# Patient Record
Sex: FEMALE | Race: WHITE
[De-identification: names, ages, dates, MRNs, and addresses within clinical notes are randomized per-mention and may not be internally consistent; named-entity substitution may affect disease eponyms.]

---

## 2018-02-09 ENCOUNTER — HOSPITAL ENCOUNTER (OUTPATIENT)
Dept: HOSPITAL 17 - NEPC | Age: 48
Setting detail: OBSERVATION
Discharge: HOME | End: 2018-02-09
Attending: INTERNAL MEDICINE | Admitting: INTERNAL MEDICINE
Payer: COMMERCIAL

## 2018-02-09 VITALS
HEART RATE: 96 BPM | RESPIRATION RATE: 18 BRPM | DIASTOLIC BLOOD PRESSURE: 87 MMHG | SYSTOLIC BLOOD PRESSURE: 129 MMHG | OXYGEN SATURATION: 95 %

## 2018-02-09 VITALS
RESPIRATION RATE: 18 BRPM | DIASTOLIC BLOOD PRESSURE: 67 MMHG | SYSTOLIC BLOOD PRESSURE: 123 MMHG | HEART RATE: 83 BPM | OXYGEN SATURATION: 96 %

## 2018-02-09 VITALS — HEART RATE: 74 BPM

## 2018-02-09 VITALS — OXYGEN SATURATION: 98 % | RESPIRATION RATE: 18 BRPM

## 2018-02-09 VITALS
DIASTOLIC BLOOD PRESSURE: 74 MMHG | OXYGEN SATURATION: 95 % | SYSTOLIC BLOOD PRESSURE: 120 MMHG | HEART RATE: 79 BPM | TEMPERATURE: 98.3 F | RESPIRATION RATE: 20 BRPM

## 2018-02-09 VITALS — BODY MASS INDEX: 38.97 KG/M2 | WEIGHT: 242.51 LBS | HEIGHT: 66 IN

## 2018-02-09 DIAGNOSIS — E11.40: ICD-10-CM

## 2018-02-09 DIAGNOSIS — I10: ICD-10-CM

## 2018-02-09 DIAGNOSIS — K21.9: ICD-10-CM

## 2018-02-09 DIAGNOSIS — E78.00: ICD-10-CM

## 2018-02-09 DIAGNOSIS — H91.90: ICD-10-CM

## 2018-02-09 DIAGNOSIS — J45.909: ICD-10-CM

## 2018-02-09 DIAGNOSIS — F41.9: ICD-10-CM

## 2018-02-09 DIAGNOSIS — R07.89: Primary | ICD-10-CM

## 2018-02-09 DIAGNOSIS — Z79.899: ICD-10-CM

## 2018-02-09 DIAGNOSIS — Z79.84: ICD-10-CM

## 2018-02-09 DIAGNOSIS — Z87.11: ICD-10-CM

## 2018-02-09 LAB
ALBUMIN SERPL-MCNC: 4 GM/DL (ref 3.4–5)
ALP SERPL-CCNC: 64 U/L (ref 45–117)
ALT SERPL-CCNC: 37 U/L (ref 10–53)
AST SERPL-CCNC: 36 U/L (ref 15–37)
BASOPHILS # BLD AUTO: 0.1 TH/MM3 (ref 0–0.2)
BASOPHILS NFR BLD: 0.8 % (ref 0–2)
BILIRUB SERPL-MCNC: 0.3 MG/DL (ref 0.2–1)
BUN SERPL-MCNC: 10 MG/DL (ref 7–18)
CALCIUM SERPL-MCNC: 9.4 MG/DL (ref 8.5–10.1)
CHLORIDE SERPL-SCNC: 101 MEQ/L (ref 98–107)
CREAT SERPL-MCNC: 1 MG/DL (ref 0.5–1)
D-DIMER: 0.29 MG/L FEU (ref 0–0.5)
EOSINOPHIL # BLD: 0.1 TH/MM3 (ref 0–0.4)
EOSINOPHIL NFR BLD: 0.8 % (ref 0–4)
ERYTHROCYTE [DISTWIDTH] IN BLOOD BY AUTOMATED COUNT: 15.4 % (ref 11.6–17.2)
GFR SERPLBLD BASED ON 1.73 SQ M-ARVRAT: 59 ML/MIN (ref 89–?)
GLUCOSE SERPL-MCNC: 107 MG/DL (ref 74–106)
HCO3 BLD-SCNC: 25.9 MEQ/L (ref 21–32)
HCT VFR BLD CALC: 39.9 % (ref 35–46)
HGB BLD-MCNC: 13.5 GM/DL (ref 11.6–15.3)
INR PPP: 1 RATIO
LYMPHOCYTES # BLD AUTO: 6.6 TH/MM3 (ref 1–4.8)
LYMPHOCYTES NFR BLD AUTO: 47.5 % (ref 9–44)
LYMPHOCYTES: 42 % (ref 9–44)
MCH RBC QN AUTO: 26.1 PG (ref 27–34)
MCHC RBC AUTO-ENTMCNC: 33.9 % (ref 32–36)
MCV RBC AUTO: 77 FL (ref 80–100)
MONOCYTE #: 0.9 TH/MM3 (ref 0–0.9)
MONOCYTES NFR BLD: 6.8 % (ref 0–8)
MONOCYTES: 3 % (ref 0–8)
NEUTROPHILS # BLD AUTO: 6.1 TH/MM3 (ref 1.8–7.7)
NEUTROPHILS NFR BLD AUTO: 44.1 % (ref 16–70)
NEUTS BAND # BLD MANUAL: 7.5 TH/MM3 (ref 1.8–7.7)
NEUTS BAND NFR BLD: 2 % (ref 0–6)
NEUTS SEG NFR BLD MANUAL: 52 % (ref 16–70)
PLATELET # BLD: 359 TH/MM3 (ref 150–450)
PMV BLD AUTO: 7.5 FL (ref 7–11)
PROT SERPL-MCNC: 9 GM/DL (ref 6.4–8.2)
PROTHROMBIN TIME: 10.4 SEC (ref 9.8–11.6)
RBC # BLD AUTO: 5.18 MIL/MM3 (ref 4–5.3)
SODIUM SERPL-SCNC: 136 MEQ/L (ref 136–145)
TROPONIN I SERPL-MCNC: (no result) NG/ML (ref 0.02–0.05)
WBC # BLD AUTO: 13.8 TH/MM3 (ref 4–11)

## 2018-02-09 PROCEDURE — 83880 ASSAY OF NATRIURETIC PEPTIDE: CPT

## 2018-02-09 PROCEDURE — 80053 COMPREHEN METABOLIC PANEL: CPT

## 2018-02-09 PROCEDURE — 85379 FIBRIN DEGRADATION QUANT: CPT

## 2018-02-09 PROCEDURE — G0378 HOSPITAL OBSERVATION PER HR: HCPCS

## 2018-02-09 PROCEDURE — 83690 ASSAY OF LIPASE: CPT

## 2018-02-09 PROCEDURE — 71045 X-RAY EXAM CHEST 1 VIEW: CPT

## 2018-02-09 PROCEDURE — 82550 ASSAY OF CK (CPK): CPT

## 2018-02-09 PROCEDURE — 85027 COMPLETE CBC AUTOMATED: CPT

## 2018-02-09 PROCEDURE — 99285 EMERGENCY DEPT VISIT HI MDM: CPT

## 2018-02-09 PROCEDURE — 93005 ELECTROCARDIOGRAM TRACING: CPT

## 2018-02-09 PROCEDURE — 82552 ASSAY OF CPK IN BLOOD: CPT

## 2018-02-09 PROCEDURE — 96374 THER/PROPH/DIAG INJ IV PUSH: CPT

## 2018-02-09 PROCEDURE — 84484 ASSAY OF TROPONIN QUANT: CPT

## 2018-02-09 PROCEDURE — 85007 BL SMEAR W/DIFF WBC COUNT: CPT

## 2018-02-09 PROCEDURE — 85610 PROTHROMBIN TIME: CPT

## 2018-02-09 PROCEDURE — 93017 CV STRESS TEST TRACING ONLY: CPT

## 2018-02-09 PROCEDURE — 85730 THROMBOPLASTIN TIME PARTIAL: CPT

## 2018-02-09 NOTE — EKG
Date Performed: 02/09/2018       Time Performed: 07:29:35

 

PTAGE:      47 years

 

EKG:      Sinus rhythm 

 

 NORMAL ECG 

 

NO PREVIOUS TRACING            

 

DOCTOR:   Benji Lopez  Interpretating Date/Time  02/09/2018 16:12:54

## 2018-02-09 NOTE — PD
HPI


Chief Complaint:  Chest Pain


Time Seen by Provider:  07:30


Travel History


International Travel<30 days:  No


Contact w/Intl Traveler<30days:  No


Traveled to known affect area:  No





History of Present Illness


HPI


Patient stated that about 2 hours ago she experienced some left-sided chest 

pressure radiating to her left shoulder, about 8 out of 10.  No alleviating or 

aggravating factors.  No associated factors such as fever, cough, back pain, 

abdominal pain, nausea, vomiting, diarrhea.








Allergy: Denies


PCP


Past medical history significant for diabetes hypertension migraine bipolar 

partial hysterectomy and peptic ulcer disease


Denies smoking





PFSH


Past Medical History


Asthma:  Yes


Depression:  Yes


Cancer:  No


Cardiovascular Problems:  No


High Cholesterol:  Yes


Diabetes:  Yes


Patient Takes Glucophage:  Yes


Diminished Hearing:  Yes (BILATERAL RINGING)


Glaucoma:  No


Hepatitis:  No


Hiatal Hernia:  No


Hypertension:  Yes


Medical other:  Yes (DEPRESSION)


Respiratory:  Yes (ASTHMA)


Immunizations Current:  Yes


Migraines:  Yes


Thyroid Disease:  No


Ulcer:  Yes


Pregnant?:  Not Pregnant


:  0





Past Surgical History


Oral Surgery:  Yes (WISDOM TEETH REMOVED)


Pacemaker:  No


Other Surgery:  Yes





Social History


Alcohol Use:  No


Tobacco Use:  No


Substance Use:  No





Allergies-Medications


(Allergen,Severity, Reaction):  


Coded Allergies:  


     No Known Allergies (Verified  Allergy, Unknown, 18)


Reported Meds & Prescriptions





Reported Meds & Active Scripts


Active


Reported


Ventolin Hfa 18 GM Inh (Albuterol Sulfate) 90 Mcg/Act Aer 2 Puff INH Q6H PRN


Glimepiride 4 Mg Tab 4 Mg PO DAILY


     Take with breakfast or first main meal


Jardiance (Empagliflozin) 10 Mg Tab 10 Mg PO DAILY


Omeprazole 20 Mg Tab 20 Mg PO DAILY


Bupropion HCl 100 Mg Tab 100 Mg PO BID


Citalopram (Citalopram Hydrobromide) 20 Mg Tab 20 Mg PO DAILY


Lisinopril 10 Mg Tab 10 Mg PO DAILY


Simvastatin 40 Mg Tab 40 Mg PO HS


Metformin (Metformin HCl) 1,000 Mg Tab 1,000 Mg PO BIDPC








Review of Systems


General / Constitutional:  No: Fever


Eyes:  No: Visual changes


HENT:  No: Headaches


Cardiovascular:  Positive: Chest Pain or Discomfort


Respiratory:  No: Shortness of Breath


Gastrointestinal:  No: Abdominal Pain


Genitourinary:  No: Dysuria


Musculoskeletal:  No: Pain


Skin:  No Rash


Neurologic:  No: Weakness


Psychiatric:  No: Depression


Endocrine:  No: Polydipsia


Hematologic/Lymphatic:  No: Easy Bruising





Physical Exam


Narrative


GENERAL: 


SKIN: Warm and dry.


HEAD: Atraumatic. Normocephalic. 


EYES: Pupils equal and round. No scleral icterus. No injection or drainage. 


ENT: No nasal bleeding or discharge.  Mucous membranes pink and moist.


NECK: Trachea midline. No JVD. 


CARDIOVASCULAR: Regular rate and rhythm.  


RESPIRATORY: No accessory muscle use. Clear to auscultation. Breath sounds 

equal bilaterally. 


GASTROINTESTINAL: Abdomen soft, non-tender, nondistended. 


MUSCULOSKELETAL: Extremities without clubbing, cyanosis, or edema. No obvious 

deformities. 


NEUROLOGICAL: Awake and alert. No obvious cranial nerve deficits.  Motor 

grossly within normal limits. Five out of 5 muscle strength in the arms and 

legs.  Normal speech.


PSYCHIATRIC: Appropriate mood and affect; insight and judgment normal.





Data


Data


Last Documented VS





Vital Signs








  Date Time  Temp Pulse Resp B/P (MAP) Pulse Ox O2 Delivery O2 Flow Rate FiO2


 


18 07:32     98 Room Air  


 


18 07:32   18     


 


18 07:29  96      








Orders





 Orders


Electrocardiogram (18 07:30)


B-Type Natriuretic Peptide (18 07:30)


Ckmb (Isoenzyme) Profile (18 07:30)


Complete Blood Count With Diff (18 07:30)


Comprehensive Metabolic Panel (18 07:30)


D-Dimer (18 07:30)


Prothrombin Time / Inr (Pt) (18 07:30)


Act Partial Throm Time (Ptt) (18 07:30)


Troponin I (18 07:30)


Lipase (18 07:30)


Chest, Single Ap (18 07:30)


Ecg Monitoring (18 07:30)


Iv Access Insert/Monitor (18 07:30)


Oximetry (18 07:30)


Oxygen Administration (18 07:30)


Sodium Chloride 0.9% Flush (Ns Flush) (18 07:30)


Aspirin Chew (Aspirin Chew) (18 07:45)


Morphine Inj (Morphine Inj) (18 07:45)


Nitroglycerin 2% Oint (Nitroglycerin 2% (18 07:45)


CKMB (18 07:34)


CKMB% (18 07:34)


Admit Order (Ed Use Only) (18 09:10)





Labs





Laboratory Tests








Test


  18


07:34


 


White Blood Count 13.8 TH/MM3 


 


Red Blood Count 5.18 MIL/MM3 


 


Hemoglobin 13.5 GM/DL 


 


Hematocrit 39.9 % 


 


Mean Corpuscular Volume 77.0 FL 


 


Mean Corpuscular Hemoglobin 26.1 PG 


 


Mean Corpuscular Hemoglobin


Concent 33.9 % 


 


 


Red Cell Distribution Width 15.4 % 


 


Platelet Count 359 TH/MM3 


 


Mean Platelet Volume 7.5 FL 


 


Neutrophils (%) (Auto) 44.1 % 


 


Lymphocytes (%) (Auto) 47.5 % 


 


Monocytes (%) (Auto) 6.8 % 


 


Eosinophils (%) (Auto) 0.8 % 


 


Basophils (%) (Auto) 0.8 % 


 


Neutrophils # (Auto) 6.1 TH/MM3 


 


Lymphocytes # (Auto) 6.6 TH/MM3 


 


Monocytes # (Auto) 0.9 TH/MM3 


 


Eosinophils # (Auto) 0.1 TH/MM3 


 


Basophils # (Auto) 0.1 TH/MM3 


 


CBC Comment AUTO DIFF 


 


Differential Total Cells


Counted 100 


 


 


Neutrophils % (Manual) 52 % 


 


Band Neutrophils % 2 % 


 


Lymphocytes % 42 % 


 


Monocytes % 3 % 


 


Eosinophils % 1 % 


 


Neutrophils # (Manual) 7.5 TH/MM3 


 


Differential Comment


  FINAL DIFF


MANUAL


 


Atypical Lymphocytes  % 


 


Platelet Estimate NORMAL 


 


Platelet Morphology Comment NORMAL 


 


Prothrombin Time 10.4 SEC 


 


Prothromb Time International


Ratio 1.0 RATIO 


 


 


Activated Partial


Thromboplast Time 24.5 SEC 


 


 


D-Dimer Quantitative (PE/DVT) 0.29 MG/L FEU 


 


Blood Urea Nitrogen 10 MG/DL 


 


Creatinine 1.00 MG/DL 


 


Random Glucose 107 MG/DL 


 


Total Protein 9.0 GM/DL 


 


Albumin 4.0 GM/DL 


 


Calcium Level 9.4 MG/DL 


 


Alkaline Phosphatase 64 U/L 


 


Aspartate Amino Transf


(AST/SGOT) 36 U/L 


 


 


Alanine Aminotransferase


(ALT/SGPT) 37 U/L 


 


 


Total Bilirubin 0.3 MG/DL 


 


Sodium Level 136 MEQ/L 


 


Potassium Level 4.0 MEQ/L 


 


Chloride Level 101 MEQ/L 


 


Carbon Dioxide Level 25.9 MEQ/L 


 


Anion Gap 9 MEQ/L 


 


Estimat Glomerular Filtration


Rate 59 ML/MIN 


 


 


Total Creatine Kinase 182 U/L 


 


Creatine Kinase MB 7.0 NG/ML 


 


Troponin I


  LESS THAN 0.02


NG/ML


 


B-Type Natriuretic Peptide 4 PG/ML 


 


Lipase 165 U/L 











MDM


Medical Decision Making


Medical Screen Exam Complete:  Yes


Emergency Medical Condition:  Yes


Medical Record Reviewed:  Yes


Interpretation(s)


EKG shows normal sinus rhythm, 83 bpm, normal intervals, no STEMI pattern noted.





Pulse ox: Normal PLETH wave.  Pulse ox 98 on room air which is within normal 

limits


Differential Diagnosis


Pneumonia versus pneumothorax versus STEMI versus non-STEMI


Narrative Course


CXR NEG FOR PNA/PTX/PLEURAL EFFUSION....TROPONIN FIRST NEG, ELECTROLYTE WNL, NL 

LFT'S/LIPASE.





Diagnosis





 Primary Impression:  


 Chest pain, rule out acute myocardial infarction





Admitting Information


Admitting Physician Requests:  Marcelino Nassar MD 2018 07:44

## 2018-02-09 NOTE — EKG
Date Performed: 02/09/2018       Time Performed: 14:27:29

 

PTAGE:      47 years

 

EKG:      Sinus rhythm 

 

 NORMAL ECG

 

PREVIOUS TRACING       : 02/09/2018 11.00 Since previous tracing, no significant change noted

 

DOCTOR:   Benji Lopez  Interpretating Date/Time  02/09/2018 16:12:10

## 2018-02-09 NOTE — HHI.HP
HPI


Primary Care Physician


Mars Blankenship MD


Chief Complaint


Chest pain


History of Present Illness


47-year-old female with history of diabetes, hypertension, and hyperlipidemia 

presents to emergency room for further evaluation chest pain.  Onset 1:30 AM.  

Works night shift 7p-7a.  Location left inframammary area with radiation to 

left scapula area.  Characterized as a gradual pressure that lingered.  No 

particular movement or position may pain better or worse.  It did not hurt to 

take a deep breath.  No associated symptoms of nausea, vomiting, dyspnea, or 

diaphoresis. Duration all evening. No known precipitating factors.  Relieving 

factors morphine provider in ER.  Currently describes chest pain as a "nagging 

ache." Unsure if pain ever has gone completely aware. Denies similar pain in 

the past.





Review of Systems


General: No fatigue,weakness, fever, chills, or recent illness change in 

appetite.  Has been her general state health.


HEENT: No HA, no vision changes, no nasal congestion or drainage, no dysphasia


CV: As stated above.  No palpitations, intermittent leg pain, or dizziness


RESP: No SOB, cough, wheeze, or recent URI.  History of asthma.  Endorsing 

asthma severe since December, although improved last 4 days.


GI: No nausea, vomiting, bowel changes, diarrhea, constipation, pain, distention

, melena, blood in the stool.  


: No dysuria, urgency, frequency, hematuria


EXT: No lower leg edema, no paraesthesias, neuropathy bilateral hands


MS: No discomfort or change in ROM, no recent fall, injury, or trauma.


NEURO: No change in memory, dizziness, difficulty with balance, LOC, motor/

sensory deficits


PSYCH: History of anxiety or managed on current medication regimen.  No 

depression or suicidal ideation.  Current situational stress.


SKIN: No rashes, no concerning lesions





Past Family Social History


Allergies:  


Coded Allergies:  


     No Known Allergies (Verified  Allergy, Unknown, 18)


Past Medical History


Type 2 diabetes, hyperlipidemia, hypertension, GERD, asthma, anxiety, neuropathy


Past Surgical History


[place] teeth extracted


Reported Medications





Reported Meds & Active Scripts


Active


Reported


Ventolin Hfa 18 GM Inh (Albuterol Sulfate) 90 Mcg/Act Aer 2 Puff INH Q6H PRN


Glimepiride 4 Mg Tab 4 Mg PO DAILY


     Take with breakfast or first main meal


Jardiance (Empagliflozin) 10 Mg Tab 10 Mg PO DAILY


Omeprazole 20 Mg Tab 20 Mg PO DAILY


Bupropion HCl 100 Mg Tab 100 Mg PO BID


Citalopram (Citalopram Hydrobromide) 20 Mg Tab 20 Mg PO DAILY


Lisinopril 10 Mg Tab 10 Mg PO DAILY


Simvastatin 40 Mg Tab 40 Mg PO HS


Metformin (Metformin HCl) 1,000 Mg Tab 1,000 Mg PO BIDPC


Active Ordered Medications





Current Medications








 Medications


  (Trade)  Dose


 Ordered  Sig/Sol


 Route  Start Time


 Stop Time Status Last Admin


 


  (NS Flush)  2 ml  UNSCH  PRN


 IVF  18 07:30


     


 


 


  (NS Flush)  2 ml  BID


 IV FLUSH  18 21:00


     


 


 


  (Tylenol)  500 mg  Q4H  PRN


 PO  18 10:00


     


 


 


  (Zofran Inj)  4 mg  Q6H  PRN


 IV PUSH  18 10:00


     


 


 


  (Nitrostat Sl)  0.4 mg  Q5M  PRN


 SL  18 10:00


     


 


 


  (Aspirin)  325 mg  DAILY


 PO  2/10/18 09:00


     


 








Family History


Noncontributory for early onset cardiovascular disease.


Social History


No known coronary artery disease.  Known hypertension, hyperlipidemia, and 

diabetes.


Lifelong nonsmoker.  Denies any alcohol or illegal drug use.


Works as a .  Endorses sedentary lifestyle.





Past cardiac testing


None





Physical Exam


Vital Signs





Vital Signs








  Date Time  Temp Pulse Resp B/P (MAP) Pulse Ox O2 Delivery O2 Flow Rate FiO2


 


18 10:45 98.3 79 20 120/74 (89) 95   


 


18 09:51  83 18 123/67 (85) 96 Room Air  


 


18 07:32     98 Room Air  


 


18 07:32   18  98 Room Air  


 


18 07:29  96 18 129/87 (101) 95 Room Air  


 


18 07:29  86 18  95 Room Air  








Physical Exam


GENERAL: Alert WN, WD, NAD, pleasant, friendly, morbidly obese  female


HEAD: NC, AT


EYES: Sclera clear, conjunctiva without injection, pupils equal and round


ENT: Mucous membranes pink and moist


CV: RRR, without murmur, rub, gallop, no JVD, S1-S2 no S3-S4.  Chest wall 

nontender with palpation.


RESP: Clear lungs throughout bilateral, no crackles, wheeze, rhonchi, 

symmetrical chest rise, nonlabored, able to speak in full sentences


ABD: Soft, NT, ND, positive bowel tones


BACK: No scoliosis


EXT: Pulses +24, no dependent edema


MS: Normal tone 4 extremities, left posterior mid scapula area tender with 

palpation, no obvious deformities, full range of motion


NEURO: CN II through CN XII grossly intact, motor strength 5/5


PSYCH: A+O 3, pleasant affect, appropriate speech, mood, insight and judgment


SKIN: Normal turgor, normal texture, no lesions, no rashes, brisk cap refill, 

even hair distribution


Laboratory





Laboratory Tests








Test


  18


07:34 18


10:40


 


White Blood Count 13.8  


 


Red Blood Count 5.18  


 


Hemoglobin 13.5  


 


Hematocrit 39.9  


 


Mean Corpuscular Volume 77.0  


 


Mean Corpuscular Hemoglobin 26.1  


 


Mean Corpuscular Hemoglobin


Concent 33.9 


  


 


 


Red Cell Distribution Width 15.4  


 


Platelet Count 359  


 


Mean Platelet Volume 7.5  


 


Neutrophils (%) (Auto) 44.1  


 


Lymphocytes (%) (Auto) 47.5  


 


Monocytes (%) (Auto) 6.8  


 


Eosinophils (%) (Auto) 0.8  


 


Basophils (%) (Auto) 0.8  


 


Neutrophils # (Auto) 6.1  


 


Lymphocytes # (Auto) 6.6  


 


Monocytes # (Auto) 0.9  


 


Eosinophils # (Auto) 0.1  


 


Basophils # (Auto) 0.1  


 


CBC Comment AUTO DIFF  


 


Differential Total Cells


Counted 100 


  


 


 


Neutrophils % (Manual) 52  


 


Band Neutrophils % 2  


 


Lymphocytes % 42  


 


Monocytes % 3  


 


Eosinophils % 1  


 


Neutrophils # (Manual) 7.5  


 


Differential Comment


  FINAL DIFF


MANUAL 


 


 


Atypical Lymphocytes   


 


Platelet Estimate NORMAL  


 


Platelet Morphology Comment NORMAL  


 


Prothrombin Time 10.4  


 


Prothromb Time International


Ratio 1.0 


  


 


 


Activated Partial


Thromboplast Time 24.5 


  


 


 


D-Dimer Quantitative (PE/DVT) 0.29  


 


Blood Urea Nitrogen 10  


 


Creatinine 1.00  


 


Random Glucose 107  


 


Total Protein 9.0  


 


Albumin 4.0  


 


Calcium Level 9.4  


 


Alkaline Phosphatase 64  


 


Aspartate Amino Transf


(AST/SGOT) 36 


  


 


 


Alanine Aminotransferase


(ALT/SGPT) 37 


  


 


 


Total Bilirubin 0.3  


 


Sodium Level 136  


 


Potassium Level 4.0  


 


Chloride Level 101  


 


Carbon Dioxide Level 25.9  


 


Anion Gap 9  


 


Estimat Glomerular Filtration


Rate 59 


  


 


 


Total Creatine Kinase 182  174 


 


Creatine Kinase MB 7.0  5.9 


 


Troponin I LESS THAN 0.02  LESS THAN 0.02 


 


B-Type Natriuretic Peptide 4  


 


Lipase 165  








Result Diagram:  


2/9/18 0734                                                                    

            2/9/18 0734





Imaging





Last 48 hours Impressions








Chest X-Ray 18 Signed





Impressions: 





 Service Date/Time:  2018 07:45 - CONCLUSION: No acute 





 disease.       Mitch Syed MD 








Course


EKG


Sinus rhythm, normal axis, no ST or T-segment changes





Caprini VTE Risk Assessment


Caprini VTE Risk Assessment:  No/Low Risk (score <= 1)


Caprini Risk Assessment Model











 Point Value = 1          Point Value = 2  Point Value = 3  Point Value = 5


 


Age 41-60


Minor surgery


BMI > 25 kg/m2


Swollen legs


Varicose veins


Pregnancy or postpartum


History of unexplained or recurrent


   spontaneous 


Oral contraceptives or hormone


   replacement


Sepsis (< 1 month)


Serious lung disease, including


   pneumonia (< 1 month)


Abnormal pulmonary function


Acute myocardial infarction


Congestive heart failure (< 1 month)


History of inflammatory bowel disease


Medical patient at bed rest Age 61-74


Arthroscopic surgery


Major open surgery (> 45 min)


Laparoscopic surgery (> 45 min)


Malignancy


Confined to bed (> 72 hours)


Immobilizing plaster cast


Central venous access Age >= 75


History of VTE


Family history of VTE


Factor V Leiden


Prothrombin 36269S


Lupus anticoagulant


Anticardiolipin antibodies


Elevated serum homocysteine


Heparin-induced thrombocytopenia


Other congenital or acquired


   thrombophilia Stroke (< 1 month)


Elective arthroplasty


Hip, pelvis, or leg fracture


Acute spinal cord injury (< 1 month)








Prophylaxis Regimen











   Total Risk


Factor Score Risk Level Prophylaxis Regimen


 


0-1      Low Early ambulation


 


2 Moderate Order ONE of the following:


*Sequential Compression Device (SCD)


*Heparin 5000 units SQ BID


 


3-4 Higher Order ONE of the following medications:


*Heparin 5000 units SQ TID


*Enoxaparin/Lovenox 40 mg SQ daily (WT < 150 kg, CrCl > 30 mL/min)


*Enoxaparin/Lovenox 30 mg SQ daily (WT < 150 kg, CrCl > 10-29 mL/min)


*Enoxaparin/Lovenox 30 mg SQ BID (WT < 150 kg, CrCl > 30 mL/min)


AND/OR


*Sequential Compression Device (SCD)


 


5 or more Highest Order ONE of the following medications:


*Heparin 5000 units SQ TID (Preferred with Epidurals)


*Enoxaparin/Lovenox 40 mg SQ daily (WT < 150 kg, CrCl > 30 mL/min)


*Enoxaparin/Lovenox 30 mg SQ daily (WT < 150 kg, CrCl > 10-29 mL/min)


*Enoxaparin/Lovenox 30 mg SQ BID (WT < 150 kg, CrCl > 30 mL/min)


AND


*Sequential Compression Device (SCD)











Assessment and Plan


Assessment and Plan


#1 Atypical chest pain-admitted to chest pain center. Rule out with 3 sets of 

EKGs, cardiac enzymes, and monitor on telemetry.  Will be seen and evaluated by 

Dr. Benji Lopez.  Discussed likelihood of completing cardiac stress test, 

due to multiple risk factors, after being ruled out and been seen by 

cardiologist.  Patient is agreeable to plan of care.


#2 History of type II diabetes-hold oral anti-glycemic that this time


#3 History of hyperlipidemia-continue simvastatin


#4 History of hypertension-continue to monitor, continue lisinopril











Coco Buckner 2018 12:59

## 2018-02-09 NOTE — HHI.DCPOC
Discharge Care Plan


Diagnosis:  


(1) Atypical chest pain


Goals to Promote Your Health


* To prevent worsening of your condition and complications


* To maintain your health at the optimal level


Directions to Meet Your Goals


*** Take your medications as prescribed


*** Follow your dietary instruction


*** Follow activity as directed








*** Keep your appointments as scheduled


*** Take your immunizations and boosters as scheduled


*** If your symptoms worsen call your PCP, if no PCP go to Urgent Care Center 

or Emergency Room***


*** Smoking is Dangerous to Your Health. Avoid second hand smoke***


***Call the 24-hour hour crisis hotline for domestic abuse at 1-406.563.1159***











Coco Buckner Feb 9, 2018 17:54

## 2018-02-09 NOTE — EKG
Date Performed: 02/09/2018       Time Performed: 11:00:10

 

PTAGE:      47 years

 

EKG:      Sinus rhythm 

 

 NONSPECIFIC T-WAVE ABNORMALITY BORDERLINE ECG

 

PREVIOUS TRACING       : 10/09/2015 11.36 Since previous tracing, no significant change noted

 

DOCTOR:   Benji Lopez  Interpretating Date/Time  02/09/2018 16:12:29

## 2018-02-09 NOTE — RADRPT
EXAM DATE/TIME:  02/09/2018 07:45 

 

[facility] COMPARISON:     

CHEST SINGLE AP, October 09, 2015, 12:17.

 

                     

INDICATIONS :     

Chest pain.

                     

 

MEDICAL HISTORY :     

Hypertension.  Diabetes mellitus type II.     Asthma. Anemia.   

 

SURGICAL HISTORY :        

Partial hysterectomy.

 

ENCOUNTER:     

Initial                                        

 

ACUITY:     

1 day      

 

PAIN SCORE:     

5/10

 

LOCATION:     

Bilateral chest 

 

FINDINGS:     

A single view of the chest demonstrates the lungs to be symmetrically aerated without evidence of mas
s, infiltrate or effusion.  The cardiomediastinal contours are unremarkable.  Osseous structures are 
intact.

 

CONCLUSION:     No acute disease.  

 

 

 

 Mitch Syed MD on February 09, 2018 at 8:06           

Board Certified Radiologist.

 This report was verified electronically.

## 2018-02-10 NOTE — TR
Date Performed: 02/09/2018       Time Performed: 16:37:21

 

DOCTOR:      Odilon Yeager 

 

DRUG LIST:     

CLINICAL HISTORY:     

REASON FOR TEST:      CP R/O MI

REASON FOR ENDING:     

OBSERVATION:     

CONCLUSION:      Manuel protocol completed. Stopped sec to exceeding target heart rate and leg fatigue
. Maximum MT=314 Target HR Achieved=87.0% Maximum NE=352/88 Total Exercise Time=5:01. No reprod chest
 pain. No ectopy. Questionable minimal st depression, horiziontal st segment inferior leads at peak, 
otherwise no other st t segment changes. Normal bp response. Recovery quick and unremarkable.

COMMENTS:

## 2018-03-02 ENCOUNTER — HOSPITAL ENCOUNTER (OUTPATIENT)
Dept: HOSPITAL 17 - HDOC | Age: 48
Discharge: HOME | End: 2018-03-02
Attending: INTERNAL MEDICINE
Payer: COMMERCIAL

## 2018-03-02 VITALS
SYSTOLIC BLOOD PRESSURE: 123 MMHG | OXYGEN SATURATION: 96 % | DIASTOLIC BLOOD PRESSURE: 80 MMHG | TEMPERATURE: 98.1 F | HEART RATE: 78 BPM | RESPIRATION RATE: 18 BRPM

## 2018-03-02 VITALS — WEIGHT: 245.15 LBS | HEIGHT: 64 IN | BODY MASS INDEX: 41.85 KG/M2

## 2018-03-02 DIAGNOSIS — I25.10: Primary | ICD-10-CM

## 2018-03-02 DIAGNOSIS — R94.39: ICD-10-CM

## 2018-03-02 PROCEDURE — 86900 BLOOD TYPING SEROLOGIC ABO: CPT

## 2018-03-02 PROCEDURE — 99152 MOD SED SAME PHYS/QHP 5/>YRS: CPT

## 2018-03-02 PROCEDURE — C1893 INTRO/SHEATH, FIXED,NON-PEEL: HCPCS

## 2018-03-02 PROCEDURE — 86901 BLOOD TYPING SEROLOGIC RH(D): CPT

## 2018-03-02 PROCEDURE — C1769 GUIDE WIRE: HCPCS

## 2018-03-02 PROCEDURE — 86850 RBC ANTIBODY SCREEN: CPT

## 2018-03-02 PROCEDURE — 93458 L HRT ARTERY/VENTRICLE ANGIO: CPT

## 2018-03-02 PROCEDURE — 99153 MOD SED SAME PHYS/QHP EA: CPT

## 2018-03-02 NOTE — CATHPROC
[employer] HIS Report

Study Information

Study Number    Admission          Scheduled Start               Study Start

 

[id number]    Mar 2 2018 8:30AM      03/02/2018                  Mar 2 2018 9:25AM

 

Universal Service

 

Cardiac Catheterization

 

Admit Source               Facility Department

 

Other                  [facility] - Cath Lab

 

Physician and Clinical Staff

Initial Perico Cardenas         Circulator       Jeet Mauro,DAVID

 

                         Recorder        Bekah Rodriguez,RT(R)

 

                         Recorder        Meseret Thompson BSN

 

                         Scrub          Radha Rincon,RT(R) (BS)

 

Procedures Performed

Procedure                    Location (Site)              Vessel Name

 

Coronary Angiograms                LCA                   Left Coronary

Coronary Angiograms                RCA                   Right Coronary

L Heart Cath

Wire insertion                  Radial (right)               Radial Art.

 

 

 

Equipment

Time          Description            Size           Mfg Part Number  Used/Scraped

                  TRANSDUCER, TRUWAVE                    [id number]

09:27    BRANDON BURGOS                     *                     Used

                  W/STOCKCOCK                        *[id number]

                                               [phone]



                                               *[id number]

                                               534-523T



                                               *[id number]

                                               [id number]

09:27    [employer]  PACK, CCL CUSTOM         *                     Used

                                               *[id number]

09:27    [employer]  SUPPORT, ARTERIAL ADULT                  [id number] *[id number] Used

                                               [id number]

09:27    [employer]     PEN, SKIN DUAL W/ RULER      *                     Used

                                               *[id number]

                  BAND, RADIAL COMPRESSION TR                [id number]

10:07    [employer]                      24CM                    Used

                  SHORT 24                          *[id number]

                  SHEATH, FR6 RADIAL PRELUDE

09:27    [employer]                      FR 6           [id number]   Used

                  EASE 11CM

                                               [id number]

09:27    [employer]     WIRE, EXCHANGE 260CM 3MMJ     260CM                   Used

                                               *[id number]

09:27    [employer]        OMNIPAQUE, 350 MG, 150ML     150ML          [id number]      Used

                                               [id number]

09:27    [employer]     BLANKET,WARM AIR CCL       *                     Used

                                               *[id number]

History: Current Medications

Medication         Dosage/Unit         Route      Frequency     Last Date/Time Taken

 

LOPRESSOR

 

Statins (any)

 

ASA

 

Albuterol

 

Prilosec

 

 

History: Allergies

Allergy               Reaction

 

No Known Allergies

 

 

History: Risk Factors

                      Family History of

Hypertension    Dyslipidemia                 Previous MI    Previous Heart Failure

                      Premature CAD

Yes         Yes           No          No        No

Prior Valve

          Prior PCI        Prior CABG

Surgery

No         No           No

          Cerebrovascular     Peripheral Artery   Chronic Lung

On Dialysis                                     Diabetes   Diabetes Therapy

          Disease         Disease        Disease

No         No           No          No        Yes      Oral

 

 

History: Stress Tests

Stress or Imaging Studies Performed

 

Yes

Standard Exercise Stress

Test

No

 

Stress Echo

 

No

 

Stress Test SPECT        Stress Test SPECT Result    Stress Test SPECT Ischemia Risk/Extent

 

Yes               Positive            Intermediate

 

Stress Test CMR

 

No

 

Cardiac CTA           Coronary Calcium Score

 

No               No

 

 

History: Other

Current Smoker

 

No

 

Labs

Hgb (g/dl)        Hct (%)         WBC (l/cumm)      Platelets (thousands)

 

11.60-17.00       35.00-51.00       4.00-11.00       150..00

 

12.5           39.4          9.3          324

Glucose (mg/dl)     BUN (mg/dl)       Creatinine (mg/dl)    BUN:Creatinine (1:x)

74..00       7.00-18.00        0.50-1.30         10.00-20.00

 

175           6            0.9            6.7

 

Na (meq/l)        K (meq/l)

 

136..00      3.50-5.10

 

141           4.4

 

INR (PTT:PT)

 

0.90-1.10

 

1

 

 

 

 

Medication

Medication Total Dose (Bolus/Oral)

Medication              Total Dosage/Unit

 

1% XYLOCAINE                 5 mL

 

FENTANYL                  75 mcg

 

HEPARIN                 5000 units

 

NTG (IC)                  200 mcg

 

VERSED                    2 mg

 

Medications (Bolus/Oral)

Medication           Time Given          Dosage/Unit       Administered By      Reason

 

VERSED             3/2/2018 9:48:12 AM       1 mg         Jeet Mauro

1 mg VERSED given in lab by Jeet Mauro RN in Left Forearm via Peripheral IV. Ordered by St caio Erickson.

 

FENTANYL            3/2/2018 9:49:23 AM      50 mcg         Jeet Mauro

50 mcg FENTANYL given in lab by Jeet Mauro RN in Left Wrist via Peripheral IV. Ordered by Perico Erickson.

 

1% XYLOCAINE          3/2/2018 9:50:57 AM       5 mL         Minor, Perico

5 mL 1% XYLOCAINE given in lab by Perico Erickson in Right Radial via Subcutaneous. Ordered by Perico Erickson.

 

VERSED             3/2/2018 9:53:46 AM       1 mg         Jeet Mauro

1 mg VERSED given in lab by Jeet Mauro RN in Left Wrist via Peripheral IV. Ordered by Maulik Erickson.

 

FENTANYL            3/2/2018 9:54:23 AM      25 mcg         Jeet Mauro

25 mcg FENTANYL given in lab by Jeet Mauro RN in Left Wrist via Peripheral IV. Ordered by Perico Erickson.

 

NTG (IC)            3/2/2018 9:56:09 AM      200 mcg         Perico Erickson

200 mcg NTG (IC) given in lab by Perico Erickson in Right Radial via Intra-arterial. Ordered by Perico Erickson for radial access

 

HEPARIN            3/2/2018 9:58:40 AM     5000 units        Jeet Mauro

5000 units HEPARIN given in lab by Jeet Mauro RN in Left Wrist via Peripheral IV. Ordered by Perico Olivarez.

 

Medication (Drip)

Medication           Time Given          Dosage/Unit      Concentration/Unit  Diluent (ml)  Solution

 

IV Solutions          3/2/2018 9:30:23 AM       0 mL (IV)                 500       NaCl .9

IV Solutions given in lab by Jeet Mauro RN in Left Wrist via Peripheral IV. Pump/Drip Flow = 20 m
l/hr using NaCl .9. Ordered by Perico Erickson.

Initial Case Assessment

Cardiovascular

HR          NIBP

 

71          130/75

 

Edema Present     Skin color          Skin

 

None         Normal            Warm Dry

 

Circulatory - Right Pulses

Dorsalis Pedis    Femoral

 

3           3

 

Scale (0,1,2,3,4,d)

 

Circulatory - Left Pulses

Dorsalis Pedis    Femoral

 

3           3

 

Scale (0,1,2,3,4,d)

 

Neurological State

           Oriented to time-place-

Alert                     Moves all extremities

           person

 

Respiration - General

Respiration Rate

           SpO2 (%)

(B/min)

16          96

Final Case Assessment

Cardiovascular

HR           Rhythm          NIBP

 

76           sr            123/73

 

Edema Present      Skin color            Skin

 

None           Normal              Warm Dry

 

Circulatory - Right Pulses

Dorsalis Pedis     Femoral          Radial

 

2            2             2

 

Scale (0,1,2,3,4,d)

 

Scale (0,1,2,3,4,d)

 

Circulatory - Lower Extremities

Color Lower Right    Color Lower Left

 

 

Normal         Normal

 

Neurological State

            Oriented to time-place-

Alert                      Moves all extremities

            person

 

Respiration - General

Respiration Rate

            SpO2 (%)

(B/min)

12           96

 

Chronological Log

Time    Study Chronological Log

 

9:30:08   Patient arrived via Bed.

 

9:30:09   Patient Name, D.O.B, / Armband Verified By R.N.

 

9:30:09   Consent signed by the physician and the patient and verified by the Cath Lab staff.

 

9:30:12   Pre-op and post- op instructions given; patient acknowledges understanding of instructions.


      Verbal Stimulation=~VERBAL~ Physical Stimulation=~PHYSICAL~ Airway=~AIRWAY~ Respiration=~RESPIR
ATION~

9:30:13

      TOTAL=~TOTAL~. (0=absent, 1=limited, 2=present)

9:30:15   Presedation assessment performed by Cath Lab RN.

 

9:30:16   Allens test performed on the left radial and ulnar artery.

 

9:30:18   Patient has been NPO for More than 6Hrs.

 

9:30:19   Skin Breakdown- none per patient

 

9:30:19   Patient Warmer Placed on the Table.

 

9:30:21   Gurdeep Prominences Protected

 

9:30:22   A # 20 IV was noted in the Wrist (left). Grade = 0

      IV Solutions given in lab by Jeet Mauro RN in Left Wrist via Peripheral IV. Pump/Drip Flow 
= 20 ml/hr using NaCl .9.

9:30:23

      Ordered by Perico Erickson.

9:30:24   History and physical on the chart or being dictated.

      Vitals capture started with the following parameters, Patient=Adult, Interval=5 min, Initial Pr
scygyq=607 mmHg,

9:40:24

      Deflation Rate=5 mmHg, Cuff placed on Left Arm

9:41:01   HR=71 bpm, CQXG=957/75 mmhg, SpO2=95.0 %, Resp=19 B/min, Pain=0, Carlitos=10, Blackwood=2

      Assessment: Initial Case, HR=71 BPM, MUEF=064/75 mmhg, Edema=None, Color=Normal, Skin = Warm, D
ry

      Right Pulses: Arturo Ped=3, Femoral=3

9:41:20  Left Pulses: Arturo Ped=3, Femoral=3

      Neurological: State=Alert, Ox3, NGO

      Respiration: Resp=16 B/min, SpO2=96 %

9:42:35  Bilateral groins prepped with 2% chlorhexidine, and draped after a 3 minute waiting time.

 

9:46:07  HR=66 bpm, QVFR=540/72 mmhg, SpO2=97.0 %, Resp=18 B/min, Pain=0, Carlitos=10, Blackwood=2

 

9:47:40  MD arrived.

 

9:48:12  1 mg VERSED given in lab by Jeet Mauro RN in Left Forearm via Peripheral IV. Ordered by 
Perico Erickson.

 

9:49:23  50 mcg FENTANYL given in lab by Jeet Mauro RN in Left Wrist via Peripheral IV. Ordered b
Perico Bruce.

      Time Out. Correct patient, correct procedure, correct physician, power injector loaded, or not 
loaded with contrast with

9:50:26

      surgical team present. Time Out Concurred by MD and individual staff in procedure.

9:50:49  Case Start

 

9:50:57  5 mL 1% XYLOCAINE given in lab by Perico Erickson in Right Radial via Subcutaneous. Ordered Perico Joseph.

 

9:51:01  HR=72 bpm, BRPB=487/76 mmhg, SpO2=93.0 %, Resp=14 B/min, Pain=0, Carlitos=10, Blackwood=2

 

9:52:27  Pressure channel 1 zeroed.

 

9:53:46  1 mg VERSED given in lab by Jeet Mauro RN in Left Wrist via Peripheral IV. Ordered by Perico Zavala.

 

9:53:47  Reference ECG taken

 

9:54:23  25 mcg FENTANYL given in lab by Jeet Mauro RN in Left Wrist via Peripheral IV. Ordered Perico Joseph.

 

9:55:05  Access site was Right Radial Artery.

      A SHEATH, FR6 RADIAL PRELUDE EASE 11CM FR 6 was advanced into the Radial (right) using the Perc
utaneous

9:55:25

      technique.

9:56:03  HR=67 bpm, GJIO=993/71 mmhg, SpO2=94.0 %, Resp=12 B/min, Pain=0, Carlitos=10, Blackwood=2

      200 mcg NTG (IC) given in lab by Perico Erickson in Right Radial via Intra-arterial. Ordered by 
Perico Erickson for radial

9:56:09

      access

      A JR 5.0 INFINITI CATHETER FR 5 was advanced over a wire. OMNIPAQUE, 350 MG, 150ML 150ML was us
ed for

9:57:38

      injections.

      Recorded Pressure: LV, HR=75, Condition=Condition 1

9:58:28

      (Left Ventricle) /1/5

      Recorded Pressure: LV, Ao, HR=73, Condition=Condition 1

9:58:37  (Left Ventricle) /5/9,

      (Aorta) Ao 106/70/89

9:58:40  5000 units HEPARIN given in lab by Jeet Mauro RN in Left Wrist via Peripheral IV. Ordere
d by Perico Erickson.

 

9:59:16  The  RCA was injected and visualized at various angles. OMNIPAQUE, 350 MG, 150ML 150ML used.


      After removing the current catheter a JL 3.5 INFINITI CATHETER FR 5 was advanced over a WIRE, E
XCHANGE 260CM

9:59:55

      3MMJ 260CM.

10:01:06  HR=87 bpm, DMDI=027/67 mmhg, SpO2=92.0 %, Resp=11 B/min, Pain=0, Carlitos=10, Blackwood=2

 

10:02:52  The  LCA was injected and visualized at various angles. OMNIPAQUE, 350 MG, 150ML 150ML used
.

 

10:04:35  A WIRE, EXCHANGE 260CM 3MMJ 260CM was inserted via Radial (right).

 

10:04:50  Wire removed

 

10:06:03  HR=75 bpm, HGHB=864/73 mmhg, SpO2=92.0 %, Resp=12 B/min, Pain=0, Carlitos=10, Blackwood=2

 

10:06:13  Catheter was removed

 

10:06:19  Case End

      Assessment: Final Case, HR=76 BPM, Rhythm=sr, JIHZ=014/73 mmhg, Edema=None, Color=Normal, Skin 
= Warm,

      Dry

      Right Pulses: Arturo Ped=2, Femoral=2, Radial=2

10:06:32  Lower Right Extremities: Color=Normal

      Lower Left Extremities: Color=Normal

      Neurological: State=Alert, Ox3, NGO

      Respiration: Resp=12 B/min, SpO2=96 %

       Radial Compression Device Used. 15 mLs of air placed in BAND, RADIAL COMPRESSION TR SHORT 24 2
4CM. Affected

10:07:11

       hand 96 % O2 saturation.

10:07:54   No case complications noted.

 

10:07:56   Cine recording checked.

 

10:08:24   Bedside Report will be given.

 

10:08:31   A Left Heart Cath was performed.

 

10:11:08   OWWL=107/63 mmhg, SpO2=94.0 %, Resp=13 B/min, Pain=0, Carlitos=10, Blackwood=2

 

10:15:36   Vitals capture stopped.

 

10:16:35   Patient moved to stretcher

 

 

 

 

End Study - Contrast Media Used In Study

Contrast       Total Opened (mL)    Total Used (mL)    Total Wasted (mL)

 

Omnipaque       45           45           0

 

End Study - Maximum Contrast Load

Max Contrast Load (mL)

 

617.7

 

End Study - Radiation Exposure

Fluoro Time

(minutes)

2.8

 

 

End Study - Patient Disposition

Complications     Transferred To

 

No           Cath Lab Holding

## 2018-03-02 NOTE — MA
cc:

Perico Erickson MD

****

 

 

03/02/2018

 

Cardiac catheterization performed on 03/02/2018.

 

INDICATIONS:

Chest pain, intermediate risk stress test.

 

REFERRING PHYSICIAN:

Darell Han DO, [facility]

 

:

Perico Erickson MD

 

PROCEDURES PERFORMED:

1.  Fluoroscopy with interpretation.

2.  Coronary angiography.

3.  Left heart catheterization.

 

METHOD:

Risks, benefits and alternatives discussed with the patient.  The 

patient understood, consented to the procedure.  The patient was 

brought in catheterization lab, placed on the catheterization table.  

Right wrist was prepped and draped in sterile fashion.  Right wrist 

was anesthetized with 2% lidocaine.  Right greater artery was 

cannulated, and a 

 

LEFT HEART CATHETERIZATION:

Intraventricular hemodynamics 114/5 mmHg.

 

CORONARY ANGIOGRAPHY:

1.  Left main coronary angiography normal.

2.  Left anterior descending coronary with some _____  in the mid 

segment, but otherwise angiographically normal.  There is a small 

diagonal branch, angiographically normal.

3.  Left circumflex gives rise to a first obtuse marginal branch, 

angiographically normal.

4.  Right coronary is a dominant vessel giving rise to a posterior 

descending branch.  Right coronary is angiographically normal.

 

CONCLUSIONS:

1.  Mild nonobstructive coronary disease.

2.  Normal left sided filling pressure.

 

PLAN:

The patient's stress test was likely artifact, potential diaphragm 

overlap.  We will continue with medical management.  She can follow 

with her primary care physician.

 

 

__________________________________

Perico Erickson MD

 

 

HEATHER/DL

D: 03/02/2018, 10:12 AM

T: 03/02/2018, 02:23 PM

Visit #: [id number]

Job #: [id number]